# Patient Record
Sex: MALE | Race: WHITE | NOT HISPANIC OR LATINO | Employment: FULL TIME | URBAN - METROPOLITAN AREA
[De-identification: names, ages, dates, MRNs, and addresses within clinical notes are randomized per-mention and may not be internally consistent; named-entity substitution may affect disease eponyms.]

---
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
## 2025-01-07 ENCOUNTER — PATIENT MESSAGE (OUTPATIENT)
Dept: PSYCHIATRY | Facility: CLINIC | Age: 20
End: 2025-01-07
Payer: MEDICAID

## 2025-01-07 ENCOUNTER — OFFICE VISIT (OUTPATIENT)
Dept: PSYCHIATRY | Facility: CLINIC | Age: 20
End: 2025-01-07
Payer: MEDICAID

## 2025-01-07 VITALS
SYSTOLIC BLOOD PRESSURE: 125 MMHG | WEIGHT: 264.25 LBS | DIASTOLIC BLOOD PRESSURE: 52 MMHG | HEART RATE: 71 BPM | BODY MASS INDEX: 33.92 KG/M2

## 2025-01-07 DIAGNOSIS — F99 INSOMNIA DUE TO OTHER MENTAL DISORDER: ICD-10-CM

## 2025-01-07 DIAGNOSIS — F51.5 NIGHTMARES: ICD-10-CM

## 2025-01-07 DIAGNOSIS — F41.1 GAD (GENERALIZED ANXIETY DISORDER): ICD-10-CM

## 2025-01-07 DIAGNOSIS — F51.05 INSOMNIA DUE TO OTHER MENTAL DISORDER: ICD-10-CM

## 2025-01-07 DIAGNOSIS — F90.2 ATTENTION DEFICIT HYPERACTIVITY DISORDER (ADHD), COMBINED TYPE: ICD-10-CM

## 2025-01-07 DIAGNOSIS — F33.0 MDD (MAJOR DEPRESSIVE DISORDER), RECURRENT EPISODE, MILD: ICD-10-CM

## 2025-01-07 PROCEDURE — 99999 PR PBB SHADOW E&M-EST. PATIENT-LVL III: CPT | Mod: PBBFAC,,, | Performed by: REGISTERED NURSE

## 2025-01-07 PROCEDURE — 99214 OFFICE O/P EST MOD 30 MIN: CPT | Mod: S$PBB,,, | Performed by: REGISTERED NURSE

## 2025-01-07 PROCEDURE — 3078F DIAST BP <80 MM HG: CPT | Mod: CPTII,,, | Performed by: REGISTERED NURSE

## 2025-01-07 PROCEDURE — G2211 COMPLEX E/M VISIT ADD ON: HCPCS | Mod: S$PBB,,, | Performed by: REGISTERED NURSE

## 2025-01-07 PROCEDURE — 3044F HG A1C LEVEL LT 7.0%: CPT | Mod: CPTII,,, | Performed by: REGISTERED NURSE

## 2025-01-07 PROCEDURE — 3074F SYST BP LT 130 MM HG: CPT | Mod: CPTII,,, | Performed by: REGISTERED NURSE

## 2025-01-07 PROCEDURE — 3008F BODY MASS INDEX DOCD: CPT | Mod: CPTII,,, | Performed by: REGISTERED NURSE

## 2025-01-07 PROCEDURE — 99213 OFFICE O/P EST LOW 20 MIN: CPT | Mod: PBBFAC,PN | Performed by: REGISTERED NURSE

## 2025-01-07 RX ORDER — TRAZODONE HYDROCHLORIDE 100 MG/1
100 TABLET ORAL NIGHTLY
Qty: 90 TABLET | Refills: 0 | Status: SHIPPED | OUTPATIENT
Start: 2025-01-07 | End: 2025-04-30

## 2025-01-07 RX ORDER — PRAZOSIN HYDROCHLORIDE 2 MG/1
2 CAPSULE ORAL NIGHTLY
Qty: 90 CAPSULE | Refills: 0 | Status: SHIPPED | OUTPATIENT
Start: 2025-01-07

## 2025-01-07 RX ORDER — LISDEXAMFETAMINE DIMESYLATE 40 MG/1
40 CAPSULE ORAL DAILY
Qty: 30 CAPSULE | Refills: 0 | Status: SHIPPED | OUTPATIENT
Start: 2025-03-10

## 2025-01-07 RX ORDER — BUSPIRONE HYDROCHLORIDE 10 MG/1
10 TABLET ORAL 2 TIMES DAILY PRN
Qty: 60 TABLET | Refills: 0 | Status: SHIPPED | OUTPATIENT
Start: 2025-01-07 | End: 2025-01-29

## 2025-01-07 RX ORDER — LISDEXAMFETAMINE DIMESYLATE 40 MG/1
40 CAPSULE ORAL DAILY
Qty: 30 CAPSULE | Refills: 0 | Status: SHIPPED | OUTPATIENT
Start: 2025-01-13

## 2025-01-07 RX ORDER — QUETIAPINE FUMARATE 25 MG/1
TABLET, FILM COATED ORAL
Qty: 180 TABLET | Refills: 0 | Status: SHIPPED | OUTPATIENT
Start: 2025-01-07

## 2025-01-07 RX ORDER — LISDEXAMFETAMINE DIMESYLATE 40 MG/1
40 CAPSULE ORAL DAILY
Qty: 30 CAPSULE | Refills: 0 | Status: SHIPPED | OUTPATIENT
Start: 2025-02-10

## 2025-01-07 RX ORDER — PRAZOSIN HYDROCHLORIDE 1 MG/1
1 CAPSULE ORAL NIGHTLY
Qty: 90 CAPSULE | Refills: 0 | Status: SHIPPED | OUTPATIENT
Start: 2025-01-07 | End: 2025-04-24

## 2025-01-07 NOTE — PROGRESS NOTES
Outpatient Psychiatry Follow-Up Visit (MD/NP)    2025    Clinical Status of Patient:  Outpatient (Ambulatory)     Chief Complaint:  Jonathan Vance is a 19 y.o. male who presents today for follow-up of depression and anxiety.  Met with patient.    Grade: 1 st year  School:  [employer]  Job: [employer]    Lives with: parents    Interval History and Content of Current Session:  Interim Events/Subjective Report/Content of Current Session:  Patient reports preparing to start  semester of [employer] school later this month.  States classes will only be on  and .  Reports work is going well.  Does admit that dog recently  leading to increased depressed episodes.  Also states that anxiety is still difficult to deal with when elevated.  Otherwise denies noticeable side effects of medications.  Reports fair to good sleep.  Reports good appetite.    10/17/2024:  Patient reports improvement in focus and concentration.  Reports mood and anxiety are doing fair overall.  Reports taking mid terms and receiving a C in anatomy.  Also reports not liking math.  Continuing to work or go to school 7 days a week.  Denies noticeable side effects of medications otherwise.  Reports fair to good sleep.  Reports good appetite.    2024:  Patient doing well overall.  Reports improvement in focus and concentration since starting Vyvanse.  Denies noticeable side effects of medications recently.  Reports continued episodes of feeling down at times although manageable.  Also reports anxiety is manageable recently.  Reports sleeping well.  Denies recent THC usage.  Reports good appetite.      2022-initial evaluation:  Patient is a 16-year-old male who presents to clinic today for initial psychiatric evaluation with provider.  Patient presents with complaints of anxiety and depression.  Patient's mother is present with patient during majority of interview.  Patient reports he started  "noticing symptoms of anxiety and depression approximately 2 and half years ago.  States he anxiety especially when he is around people that he does not know.  Reports often not wanting to do anything or even get out of bed.  States his grandmother passed away from cancer in early 2019; following his grandmother's death he lost contact with his grandfather.  Additionally reports his close friend committed suicide in May of last year.  Patient reports anxiety leads to feelings of being overwhelmed and wanting to isolate.  Patient is currently home schooled and just completed the 11th grade course work.  States he plans to graduate in April of next year.  Patient is currently working for [employer] for the [employer].  Patient states in 2020 there were rumors at the school that the patient wanted to kill himself; the principal called the mother and the patient was evaluated at [facility] and released same day.  Patient denies ever having thoughts of suicide.  Patient has had migraines for much of his life, however they improved when the patient left in person school.  Neurologist feels that stress from school may have been increase in episodes of migraines.  Of note the patient reports he cut his thighs for a "release".  States he cut self for approximately 6 months with the most recent episode being approximately 1 year ago.  Of note according to mom the patient hid behind her as a small child was scared to go to school.  Additionally the mother reports she suffers with anxiety and depression as well.  States she currently takes Cymbalta 60 mg with positive results. Of note the patient vapes nicotine daily and delta 8 THC approximately once weekly for the past few months.  Patient enjoys hanging out with friends and watching television.  Patient has never been on medication for anxiety or depression. Patient denies current suicidal ideations, homicidal ideations, thoughts " of self-harm, paranoia and hallucinations.        Patient  reviewed this visit.     Review of Systems   PSYCHIATRIC: Pertinant items are noted in the narrative.    Past Medical, Family and Social History: The patient's past medical, family and social history have been reviewed and updated as appropriate within the electronic medical record - see encounter notes.    Compliance:  See above    Side effects: see above    Risk Parameters:  Patient reports no suicidal ideation  Patient reports no homicidal ideation  Patient reports no self-injurious behavior  Patient reports no violent behavior    Exam (detailed: at least 9 elements; comprehensive: all 15 elements)         8/8/2023     2:04 PM 6/9/2023    12:52 PM 3/3/2023     1:13 PM   GAD7   1. Feeling nervous, anxious, or on edge? 1  1  1    2. Not being able to stop or control worrying? 0  0  0    3. Worrying too much about different things? 1  1  0    4. Trouble relaxing? 1  2  2    5. Being so restless that it is hard to sit still? 1  1  1    6. Becoming easily annoyed or irritable? 0  1  0    7. Feeling afraid as if something awful might happen? 0  0  0    8. If you checked off any problems, how difficult have these problems made it for you to do your work, take care of things at home, or get along with other people? 1  2  1    MAYA-7 Score 4 6 4       Proxy-reported          No data to display                Constitutional  Vitals:  Most recent vital signs, dated less than 90 days prior to this appointment, were reviewed.   Vitals:    01/07/25 1627   BP: (!) 125/52   Pulse: 71   Weight: 119.8 kg (264 lb 3.5 oz)        General:  age appropriate, obese     Musculoskeletal  Muscle Strength/Tone:  no spasicity, no rigidity, no flaccidity   Gait & Station:  non-ataxic     Psychiatric  Speech:  no latency; no press   Mood & Affect:  steady  congruent and appropriate   Thought Process:  normal and logical   Associations:  intact   Thought Content:  normal, no  suicidality, no homicidality, delusions, or paranoia   Insight:  intact, has awareness of illness   Judgement: behavior is adequate to circumstances, age appropriate   Orientation:  grossly intact   Memory: intact for content of interview   Language: grossly intact   Attention Span & Concentration:  able to focus   Fund of Knowledge:  intact and appropriate to age and level of education, familiar with aspects of current personal life     Assessment and Diagnosis   Status/Progress: Based on the examination today, the patient's problem(s) is/are adequately but not ideally controlled.  New problems have not been presented today.   Co-morbidities are not complicating management of the primary condition.      General Impression:  Patient reports mild regression in mood and anxiety.  Reports improvement in nightmares.  Continued improvement in sleep noted.  Reports improvement in focus and concentration.  Denies noticeable side effects of medication.  Discussed increasing BuSpar for symptoms of anxiety.  Discussed risks versus benefits of medication changes.  Patient agreeable to current treatment plan.  Denies current suicidal ideations, homicidal ideations, thoughts of self-harm, paranoia and hallucinations.    Visit today included increased complexity associated with the care of the episodic problem see below addressed and managing the longitudinal care of the patient due to the serious and/or complex managed problem(s) see below.      ICD-10-CM ICD-9-CM   1. MAYA (generalized anxiety disorder)  F41.1 300.02   2. MDD (major depressive disorder), recurrent episode, mild  F33.0 296.31   3. Attention deficit hyperactivity disorder (ADHD), combined type  F90.2 314.01   4. Insomnia due to other mental disorder  F51.05 300.9    F99 327.02   5. Nightmares  F51.5 307.47       Intervention/Counseling/Treatment Plan   Medication Management: The risks and benefits of medication were discussed with the patient.  Counseling provided  with patient and family as follows: importance of compliance with chosen treatment options was emphasized, risks and benefits of treatment options, including medications, were discussed with the patient, prognosis, patient and family education, instructions for  management, treatment and follow-up were reviewed   Discussed options for ADHD treatment including stimulant medications and nonstimulant medications.  Discussed risks versus benefits of each.  Discussed risks of tardive dyskinesia, drug induced parkinsonism, metabolic side effects, including weight gain, neuroleptic malignant syndrome   Discussed risk of serotonin syndrome with these medications. Symptoms of concern include agitation/restlessness, confusion, rapid heart rate/high blood pressure, dilated pupils, loss of muscle coordination, muscle rigidity, heavy sweating.  Educated on Black Box warning for SSRI's with younger patients and suicidality. Instructed to go to ER or call 911 if thoughts of suicide begin or worsen. Patient and mother verbalized understanding.   Discussed with patient informed consent, risks vs. benefits, alternative treatments, side effect profile the inherent unpredictability of individual responses to these treatments. The patient express understanding of the above and display the capacity to agree with this current plan and had no other questions.  [facility]      Medications:   Continue Cymbalta 20 mg by mouth twice daily.  Continue Prazosin 3 mg by mouth nightly.   Continue Trazodone 100 mg by mouth nightly.   Continue Seroquel 25 mg by mouth nightly.  May take 2nd dose is still awake and 1 hour.  Stop Propranolol.    May take Buspar 10 mg by mouth two times daily as needed for anxiety.   Continue Vyvanse 40 mg by mouth daily.    Return to Clinic: 3 weeks    Patient instructed to please go to emergency department if feeling as though you are going to harm to yourself or others or if you are in crisis; or to  please call the clinic to report any worsening of symptoms or problems associated with medication.     A portion of this note was created using "[employer] voice recognition software that occasionally misinterprets phrases or words.

## 2025-01-07 NOTE — PATIENT INSTRUCTIONS
Continue Cymbalta 20 mg by mouth twice daily.    Continue Prazosin 3 mg by mouth nightly.     Continue Trazodone 100 mg by mouth nightly.     Continue Seroquel 25 mg by mouth nightly. May take 2nd dose if still awake in 1 hour.     Stop Propranolol.     May take Buspar 10 mg by mouth two times daily as needed for anxiety.     Continue Vyvanse 40 mg by mouth daily.       [facility] Today.           Please go to emergency department if feeling as though you are going to harm to yourself or others or if you are in crisis.     Please call the clinic to report any worsening of symptoms or problems associated with medication.      National Suicide Prevention Lifeline    The Lifeline provides 24/7, free and confidential support for people in distress, prevention and crisis resources for you or your loved ones, and best practices for professionals in the [place].    [phone]    988 has been designated as the new three-digit dialing code that will route callers to the National Suicide Prevention Lifeline. While some areas may be currently able to connect to the Lifeline by dialing 988, this dialing code will be available to everyone across the [place] starting on July 16, 2022.     988      Lifeline Chat    Lifeline Chat is a service of the National Suicide Prevention Lifeline, connecting individuals with counselors for emotional support and other services via web chat. All chat centers in the Lifeline network are accredited by [employer]. Lifeline Chat is available 24/7 across the [place]    https://suicidepreventionlifeline.org/chat/

## 2025-01-09 ENCOUNTER — OFFICE VISIT (OUTPATIENT)
Dept: PRIMARY CARE CLINIC | Facility: CLINIC | Age: 20
End: 2025-01-09
Payer: MEDICAID

## 2025-01-09 VITALS
WEIGHT: 269.5 LBS | BODY MASS INDEX: 34.59 KG/M2 | HEIGHT: 74 IN | HEART RATE: 83 BPM | DIASTOLIC BLOOD PRESSURE: 76 MMHG | OXYGEN SATURATION: 100 % | SYSTOLIC BLOOD PRESSURE: 130 MMHG

## 2025-01-09 DIAGNOSIS — Z11.3 SCREEN FOR STD (SEXUALLY TRANSMITTED DISEASE): ICD-10-CM

## 2025-01-09 DIAGNOSIS — Z11.4 SCREENING FOR HIV (HUMAN IMMUNODEFICIENCY VIRUS): ICD-10-CM

## 2025-01-09 DIAGNOSIS — Z00.00 ENCOUNTER FOR PREVENTIVE HEALTH EXAMINATION: Primary | ICD-10-CM

## 2025-01-09 DIAGNOSIS — Z13.220 SCREENING CHOLESTEROL LEVEL: ICD-10-CM

## 2025-01-09 DIAGNOSIS — Z13.1 SCREENING FOR DIABETES MELLITUS: ICD-10-CM

## 2025-01-09 DIAGNOSIS — Z13.29 SCREENING FOR THYROID DISORDER: ICD-10-CM

## 2025-01-09 DIAGNOSIS — F90.9 ATTENTION DEFICIT HYPERACTIVITY DISORDER (ADHD), UNSPECIFIED ADHD TYPE: ICD-10-CM

## 2025-01-09 DIAGNOSIS — Z11.59 ENCOUNTER FOR HEPATITIS C SCREENING TEST FOR LOW RISK PATIENT: ICD-10-CM

## 2025-01-09 DIAGNOSIS — Z72.0 VAPES NICOTINE CONTAINING SUBSTANCE: ICD-10-CM

## 2025-01-09 PROCEDURE — 99215 OFFICE O/P EST HI 40 MIN: CPT | Mod: PBBFAC,PN | Performed by: NURSE PRACTITIONER

## 2025-01-09 PROCEDURE — 87661 TRICHOMONAS VAGINALIS AMPLIF: CPT | Performed by: NURSE PRACTITIONER

## 2025-01-09 PROCEDURE — 87491 CHLMYD TRACH DNA AMP PROBE: CPT | Performed by: NURSE PRACTITIONER

## 2025-01-09 PROCEDURE — 99999 PR PBB SHADOW E&M-EST. PATIENT-LVL V: CPT | Mod: PBBFAC,,, | Performed by: NURSE PRACTITIONER

## 2025-01-09 NOTE — PATIENT INSTRUCTIONS
PALLIATIVE CARE PROGRESS NOTE  [facility]     Patient Name: Harrison Thomas  Date of Admission: 1/4/2025   Today the patient was seen for: goals of care and symptoms     Recommendations & Counseling       GOALS OF CARE:   DNR/DNI, wants to continue antibiotics right now, also wants to continue restorative cares in the hospital, he is ok today with interventions and testing that was ordered. He is anxious to know results of his imaging.  He is aware he will be in the hospital longer, he is ok with that, discussed I expect him to be in the hospital likely over the weekend  If he is able to discharge the plan has been to discharge with hospice, needing to make a plan that is safe for him outside of the hospital, more support at daughters or sisters house vs. [facility] vs. LTC. Appreciate unit SW supporting the process of this.  He is agreeable to revisit things early next week with our team around goals and symptoms.    ADVANCE CARE PLANNING:  Patient has an advance directive dated 3/13/2023.  Primary Health Care Agent Adeola CHANTELLE Thomas- spouse but currently divorce.  Alternate(s) daughter- Janey.  He would not want to involve his wife anymore and would want Janey to support him with decision making.  There is a POLST form on file, but will need to be updated prior to discharge.  Code status: No CPR- Do NOT Intubate    MEDICAL MANAGEMENT:   #Pain,chronic: Usually takes 10 mg oxycodone 4 times a day at home.  #Dyspnea,COPD: dyspneic in bed and with talking. Audible crackles from across the room. Continues to take morphine. He prefers to maximize the nebulizer as much as possible. Feels about the same today.  Wants to maximize duonebs, can take up to 6 doses a day. Would add two doses PRN   Can also add a saline nebulizers PRN for dyspnea  Fan at bedside would be helpful, doesn't like air blowing on his face. OK to not use if he doesn't like it. Oxygen is also likely helping  Continue Zoloft 100 mg by mouth nightly.     Continue Prazosin 3 mg by mouth nightly.     Continue Trazodone 100 mg by mouth nightly.     Continue Seroquel 25 mg by mouth nightly.    May take Propranolol 10 mg by mouth three times daily as needed for anxiety.         Please go to emergency department if feeling as though you are going to harm to yourself or others or if you are in crisis.     Please call the clinic to report any worsening of symptoms or problems associated with medication.      National Suicide Prevention Lifeline    The Lifeline provides 24/7, free and confidential support for people in distress, prevention and crisis resources for you or your loved ones, and best practices for professionals in the [place].    [phone]    988 has been designated as the new three-digit dialing code that will route callers to the National Suicide Prevention Lifeline. While some areas may be currently able to connect to the Lifeline by dialing 988, this dialing code will be available to everyone across the [place] starting on July 16, 2022.     988      Lifeline Chat    Lifeline Chat is a service of the National Suicide Prevention Lifeline, connecting individuals with counselors for emotional support and other services via web chat. All chat centers in the Lifeline network are accredited by [employer] Lifeline Chat is available 24/7 across the [place]    https://suicidepreventionlifeline.org/chat/   some with dyspnea treatment.  Continue morphine 10 mg TID scheduled, can treat baseline pain and hope would be to also treat some of his baseline dyspnea  If not taking morphine scheduled doses, would consider buprenorphine patch for dyspnea, would need to check with pharmacy/hospice agency if this would be covered.  Continue morphine to only give 10 mg dose q2h PRN  Encourage use of morphine.  If morphine not helpful I have also added lorazepam 0.25 mg q6h PRN and encouraged him to try this if the morphine is not helping     #At risk for OIC: last BM was 1/7  Consider adding 1/2 dose of miralax if no BM in 2 days    PSYCHOSOCIAL/SPIRITUAL:  Family daughter Janey.     Palliative Care will continue to follow. Thank you for the consult and allowing us to aid in the care of Harrison Thomas.    These recommendations have been discussed with primary team.    PREETI Moreira CNS  [facility], Palliative Care  Securely message with the [employer] Web Console (learn more here) or  Text page via [facility] Paging/Directory        Assessment          Harrison Thomas is a 77 year old male with a past medical history of thyroid cancer with lung metastases, parkinson's disease, neuropathy, glaucoma, osteoporosis, chronic pain, severe COPD, HLD, BRENNEN, CAD who presented on 1/4 with a 7 day history of productive cough and 1 day history of weakness. Upon admission CT pulmonary angio done and was negative for PE, treated for COPD exacerbation and PNA. Prior to admission he enrolled in hospice however presented to the hospital after being unable to reach hospice and then needed to call 911.      Today, the patient was seen for:  Severe COPD  History of thyroid cancer with lung mets  Parkinson's disease      Interval History:     Multidisciplinary collaboration:  Notes reviewed, worsening WBCs, getting IV steroids and on antibiotics as well. Getting IV lasix.     Notable medications:  Sinemet TID  Gabapentin 400 mg TID  Morphine  TID  Morphine PRN - x0  Lorazepam PRN - x0    Patient/family narrative  Saw today, he doesn't feel any worse then yesterday when I saw him. He is ok staying in the hospital a little longer. Wanted a black coffee.    Review of Systems:     Besides above, ROS was reviewed and is unremarkable        Physical Exam:   Temp:  [97.6  F (36.4  C)-98.2  F (36.8  C)] 98.2  F (36.8  C)  Pulse:  [] 82  Resp:  [16-20] 20  BP: (152-162)/(76-93) 152/76  SpO2:  [86 %-92 %] 86 %  157 lbs 14.4 oz    Constitutional: Awake, alert, cooperative, chronically ill appearing, no apparent distress  Lungs: some increased work of breathing, good air exchange  Neurologic: Awake, alert, oriented to name, place and time.  Neuropsychiatric: Normal affect, mood, orientation, memory and insight.  Skin: No rashes, erythema            Data Reviewed:     Results for orders placed or performed during the hospital encounter of 01/04/25 (from the past 24 hours)   CBC with platelets   Result Value Ref Range    WBC Count 15.2 (H) 4.0 - 11.0 10e3/uL    RBC Count 4.80 4.40 - 5.90 10e6/uL    Hemoglobin 15.3 13.3 - 17.7 g/dL    Hematocrit 44.8 40.0 - 53.0 %    MCV 93 78 - 100 fL    MCH 31.9 26.5 - 33.0 pg    MCHC 34.2 31.5 - 36.5 g/dL    RDW 13.8 10.0 - 15.0 %    Platelet Count 42 (LL) 150 - 450 10e3/uL   Basic metabolic panel   Result Value Ref Range    Sodium 142 135 - 145 mmol/L    Potassium 3.8 3.4 - 5.3 mmol/L    Chloride 101 98 - 107 mmol/L    Carbon Dioxide (CO2) 26 22 - 29 mmol/L    Anion Gap 15 7 - 15 mmol/L    Urea Nitrogen 24.2 (H) 8.0 - 23.0 mg/dL    Creatinine 0.67 0.67 - 1.17 mg/dL    GFR Estimate >90 >60 mL/min/1.73m2    Calcium 9.3 8.8 - 10.4 mg/dL    Glucose 118 (H) 70 - 99 mg/dL   RBC and Platelet Morphology   Result Value Ref Range    RBC Morphology Confirmed RBC Indices     Platelet Assessment  Automated Count Confirmed. Platelet morphology is normal.     Automated Count Confirmed. Platelet morphology is normal.    Polychromasia Slight  (A) None Seen   Procalcitonin   Result Value Ref Range    Procalcitonin 0.12 <0.50 ng/mL     *Note: Due to a large number of results and/or encounters for the requested time period, some results have not been displayed. A complete set of results can be found in Results Review.     Recent Labs   Lab 01/09/25  0958 01/08/25  0606 01/07/25  0524 01/05/25  0150 01/04/25  1227   WBC 15.2* 12.1* 8.8   < > 15.8*   HGB 15.3 15.1 14.5   < > 16.2   MCV 93 95 93   < > 92   PLT 42* 52* 62*   < > 50*    142 140   < > 142   POTASSIUM 3.8 4.4 3.7   < > 3.9   CHLORIDE 101 104 102   < > 101   CO2 26 30* 29   < > 26   BUN 24.2* 17.4 13.0   < > 26.7*   CR 0.67 0.69 0.59*   < > 0.87   ANIONGAP 15 8 9   < > 15   KARLIE 9.3 9.0 8.2*   < > 8.4*   * 74 92   < > 96   ALBUMIN  --   --   --   --  3.8   PROTTOTAL  --   --   --   --  6.1*   BILITOTAL  --   --   --   --  0.5   ALKPHOS  --   --   --   --  89   ALT  --   --   --   --  6   AST  --   --   --   --  18    < > = values in this interval not displayed.       No results found for this or any previous visit (from the past 24 hours).      Medical Decision Making       60 MINUTES SPENT BY ME on the date of service doing chart review, history, exam, documentation & further activities per the note.

## 2025-01-09 NOTE — PROGRESS NOTES
Subjective:       Patient ID: Jonathan Vance is a 19 y.o. male.    Chief Complaint: Annual Exam    History of Present Illness  CHIEF COMPLAINT:  Patient presents today for a yearly checkup and preventative care.    ALLERGIES:  He reports allergies to Claritin and morphine.    SEXUAL HEALTH:  He reports engaging in high-risk sexual activities and agrees to STD testing. He denies dysuria and urinary difficulties.    PREVENTIVE CARE:  His last eye exam was one year ago and he wears glasses. He recently completed labs with his gastroenterologist. He received rabies vaccination for school requirements but denies pneumonia or influenza vaccinations.    SOCIAL HISTORY:  He works at [employer] in [place], remaining active on his feet 7-8 hours daily. He is currently attending both college and tech school.            Past Medical History:   Diagnosis Date    Anxiety disorder, unspecified     Fractures     Migraine headache         Past Surgical History:   Procedure Laterality Date    LAPAROSCOPIC CHOLECYSTECTOMY N/A 7/12/2024    Procedure: CHOLECYSTECTOMY, LAPAROSCOPIC;  Surgeon: Forest Brewer MD;  Location: [facility] OR [address] [place] [address];  Service: General;  Laterality: N/A;    NO PAST SURGERIES          Family History   Problem Relation Name Age of Onset    Broken bones Mother      No Known Problems Father      No Known Problems Maternal Grandfather      No Known Problems Paternal Grandmother         Social History     Tobacco Use   Smoking Status Every Day   Smokeless Tobacco Never   Tobacco Comments    Vaping with nicotine       Social History     Social History Narrative    Lives in [place] with both parents and 10th grade attends home school        Review of patient's allergies indicates:   Allergen Reactions    Claritin [loratadine]     Opioids - morphine analogues Hallucinations        Review of Systems   Constitutional:  Negative for activity change.   HENT:  Negative for hearing loss and trouble swallowing.   "  Eyes:  Negative for discharge.   Respiratory:  Negative for chest tightness and wheezing.    Cardiovascular:  Negative for chest pain and palpitations.   Gastrointestinal:  Negative for constipation, diarrhea and vomiting.   Genitourinary:  Negative for difficulty urinating and hematuria.   Musculoskeletal:  Negative for neck pain.   Neurological:  Negative for headaches.   Psychiatric/Behavioral:  Negative for dysphoric mood.          Objective:      Vitals:    01/09/25 1541 01/09/25 1600   BP: (!) 147/66 130/76   BP Location: Right arm    Patient Position: Sitting    Pulse: 83    SpO2: 100%    Weight: 122.3 kg (269 lb 8.2 oz)    Height: 6' 2" (1.88 m)         Physical Exam  Constitutional:       Appearance: Normal appearance.   Eyes:      Comments: Wears glasses   Pulmonary:      Effort: Pulmonary effort is normal. No respiratory distress.   Musculoskeletal:         General: Normal range of motion.      Cervical back: Normal range of motion.   Neurological:      Mental Status: He is alert and oriented to person, place, and time.         Assessment:       1. Encounter for preventive health examination    2. Attention deficit hyperactivity disorder (ADHD), unspecified ADHD type    3. Screening cholesterol level    4. Screening for diabetes mellitus    5. Screening for HIV (human immunodeficiency virus)    6. Screening for thyroid disorder    7. Encounter for hepatitis C screening test for low risk patient    8. Screen for STD (sexually transmitted disease)    9. Vapes nicotine containing substance        Plan:       Encounter for preventive health examination  Counseled patient on importance of health prevention screening, immunizations, and overall wellness.   Immunizations reviewed.      Attention deficit hyperactivity disorder (ADHD), unspecified ADHD type  Stable, continuing current management.       Screening cholesterol level  -     Lipid Panel; Future; Expected date: 01/09/2025    Screening for diabetes " mellitus  -     Hemoglobin A1C; Future; Expected date: 01/09/2025    Screening for HIV (human immunodeficiency virus)  -     HIV 1/2 Ag/Ab (4th Gen); Future; Expected date: 01/09/2025    Screening for thyroid disorder  -     T4, Free; Future; Expected date: 01/09/2025  -     TSH; Future; Expected date: 01/09/2025    Encounter for hepatitis C screening test for low risk patient  -     Hepatitis C Antibody; Future; Expected date: 01/09/2025    Screen for STD (sexually transmitted disease)  -     C. trachomatis/N. gonorrhoeae by AMP DNA  -     Trichomonas vaginalis, RNA, Qual, Urine  -     Treponema Pallidium Antibodies IgG, IgM; Future; Expected date: 01/09/2025    Vapes nicotine containing substance    BMI 34.0-34.9,adult     Assessment & Plan    PREVENTATIVE CARE:  - Conducted a yearly checkup and preventative care exam for the patient.  - Reviewed previous lab results and determined additional tests are needed.    DIABETES SCREENING:  - Identified the need for diabetes screening.  - Ordered a glucose test as part of the labs.    HYPERLIPIDEMIA SCREENING:  - Identified the need for cholesterol screening.  - Ordered a cholesterol panel as part of the labs.    THYROID DISORDER SCREENING:  - Identified the need for thyroid screening.  - Ordered a thyroid panel as part of the labs.    SEXUALLY TRANSMITTED DISEASES (STDS) SCREENING:  - Acknowledged the need for STD testing based on the patient's request.  - Ordered urine tests for chlamydia, gonorrhea, and trichomonas.    URINARY TRACT INFECTION SCREENING:  - Ordered urine tests for urinary tract infection.    HYPERTENSION SCREENING:  - Instructed the patient to follow up in room 4 after check-out for a blood pressure check.    RABIES VACCINATION:  - Noted that the patient received a rabies shot for school.    FLU VACCINATION:  - Inquired about pneumonia and flu vaccinations.  - Recommend pneumonia and flu vaccinations.  - Administered flu vaccination.    PNEUMONIA  VACCINATION:  - Inquired about pneumonia and flu vaccinations.  - Recommend pneumonia and flu vaccinations.    LABS:  - Ordered labs for diabetes screening, cholesterol, and thyroid function.  - Ordered urine tests for sexually transmitted diseases (STDs) and urinary tract infection.    OTHER INSTRUCTIONS:  - Urinalysis ordered.  - Complete labs before leaving the office.          Medication List with Changes/Refills   Current Medications    AZELASTINE (ASTELIN) 137 MCG (0.1 %) NASAL SPRAY    Spray 2 sprays into each nostil daily.    BUSPIRONE (BUSPAR) 10 MG TABLET    Take 1 tablet (10 mg total) by mouth 2 (two) times daily as needed (anxiety).    CETIRIZINE (ZYRTEC) 10 MG TABLET    Take 1 tablet (10 mg total) by mouth once daily.    DULOXETINE (CYMBALTA) 20 MG CAPSULE    Take 1 capsule (20 mg total) by mouth 2 (two) times daily.    LISDEXAMFETAMINE (VYVANSE) 40 MG CAP    Take 1 capsule (40 mg total) by mouth once daily.    LISDEXAMFETAMINE (VYVANSE) 40 MG CAP    Take 1 capsule (40 mg total) by mouth once daily.    LISDEXAMFETAMINE (VYVANSE) 40 MG CAP    Take 1 capsule (40 mg total) by mouth once daily.    METOCLOPRAMIDE HCL (REGLAN) 5 MG TABLET    Take one tablet by mouth three times a day before meals as needed    ONDANSETRON (ZOFRAN) 8 MG TABLET    Take 1 tablet by mouth three times a day as needed.    PANTOPRAZOLE (PROTONIX) 40 MG TABLET    Take 1 tablet (40 mg total) by mouth once daily 30 minutes before breakfast.    PRAZOSIN (MINIPRESS) 1 MG CAP    Take 1 capsule (1 mg total) by mouth every evening. (With 2 mg - total 3 mg at bedtime)    PRAZOSIN (MINIPRESS) 2 MG CAP    Take 1 capsule (2 mg total) by mouth every evening. With 1 mg capsule to total 3 mg nightly.    PREDNISONE (DELTASONE) 20 MG TABLET    Take 2 tablet by mouth in the morning for 5 days with food.    PROMETHAZINE-DEXTROMETHORPHAN (PROMETHAZINE-DM) 6.25-15 MG/5 ML SYRP    Take 5 mLs by mouth every 4 to 6 hours as needed for cough    QUETIAPINE  (SEROQUEL) 25 MG TAB    Take 1 tablet by mouth nightly as needed for insomnia. May repeat if still awake in 1 hour.    SUCRALFATE (CARAFATE) 1 GRAM TABLET    Dissolve 1 tablet in water and drink four times a day.    TRAZODONE (DESYREL) 100 MG TABLET    Take 1 tablet (100 mg total) by mouth every evening.            Follow up if symptoms worsen or fail to improve.        MIKY Hernandez, MSN, FNP-C     This note was generated with the assistance of ambient listening technology. Verbal consent was obtained by the patient and accompanying visitor(s) for the recording of patient appointment to facilitate this note. I attest to having reviewed and edited the generated note for accuracy, though some syntax or spelling errors may persist. Please contact the author of this note for any clarification.

## 2025-01-09 NOTE — PATIENT INSTRUCTIONS
If you receive a survey, please complete it; thanks  Happy New Year!!!     Please get your labs done at the laboratory today, once you have checked out for this appointment, we will get you schedule for  labs to be done today.    I will communicate your laboratory and/or imaging results with you through your [employer] account that was set up through the portal, it was a pleasure meeting and taking care of you today.

## 2025-01-10 DIAGNOSIS — F41.1 GAD (GENERALIZED ANXIETY DISORDER): ICD-10-CM

## 2025-01-10 DIAGNOSIS — F33.0 MDD (MAJOR DEPRESSIVE DISORDER), RECURRENT EPISODE, MILD: ICD-10-CM

## 2025-01-10 RX ORDER — DULOXETIN HYDROCHLORIDE 20 MG/1
20 CAPSULE, DELAYED RELEASE ORAL 2 TIMES DAILY
Qty: 180 CAPSULE | Refills: 0 | Status: SHIPPED | OUTPATIENT
Start: 2025-01-10 | End: 2025-04-10

## 2025-01-11 LAB
SPECIMEN SOURCE: NORMAL
T VAGINALIS RRNA SPEC QL NAA+PROBE: NEGATIVE

## 2025-01-29 ENCOUNTER — OFFICE VISIT (OUTPATIENT)
Dept: PSYCHIATRY | Facility: CLINIC | Age: 20
End: 2025-01-29
Payer: MEDICAID

## 2025-01-29 DIAGNOSIS — F90.2 ATTENTION DEFICIT HYPERACTIVITY DISORDER (ADHD), COMBINED TYPE: ICD-10-CM

## 2025-01-29 DIAGNOSIS — F99 INSOMNIA DUE TO OTHER MENTAL DISORDER: ICD-10-CM

## 2025-01-29 DIAGNOSIS — F51.5 NIGHTMARES: ICD-10-CM

## 2025-01-29 DIAGNOSIS — F33.0 MDD (MAJOR DEPRESSIVE DISORDER), RECURRENT EPISODE, MILD: ICD-10-CM

## 2025-01-29 DIAGNOSIS — F41.1 GAD (GENERALIZED ANXIETY DISORDER): Primary | ICD-10-CM

## 2025-01-29 DIAGNOSIS — F51.05 INSOMNIA DUE TO OTHER MENTAL DISORDER: ICD-10-CM

## 2025-01-29 PROCEDURE — 1159F MED LIST DOCD IN RCRD: CPT | Mod: CPTII,95,, | Performed by: REGISTERED NURSE

## 2025-01-29 PROCEDURE — G2211 COMPLEX E/M VISIT ADD ON: HCPCS | Mod: 95,,, | Performed by: REGISTERED NURSE

## 2025-01-29 PROCEDURE — 3044F HG A1C LEVEL LT 7.0%: CPT | Mod: CPTII,95,, | Performed by: REGISTERED NURSE

## 2025-01-29 PROCEDURE — 98006 SYNCH AUDIO-VIDEO EST MOD 30: CPT | Mod: 95,,, | Performed by: REGISTERED NURSE

## 2025-01-29 PROCEDURE — 1160F RVW MEDS BY RX/DR IN RCRD: CPT | Mod: CPTII,95,, | Performed by: REGISTERED NURSE

## 2025-01-29 RX ORDER — BUSPIRONE HYDROCHLORIDE 10 MG/1
10 TABLET ORAL 3 TIMES DAILY
Qty: 90 TABLET | Refills: 1 | Status: SHIPPED | OUTPATIENT
Start: 2025-01-29 | End: 2025-03-30

## 2025-01-29 NOTE — PROGRESS NOTES
Outpatient Psychiatry Follow-Up Visit (MD/NP)    2025    Clinical Status of Patient:  Outpatient (Ambulatory)(Virtual)  The patient location is:  [facility]  The patient phone number is:  [phone]  Visit type: Virtual visit with synchronous audio and video  Each patient to whom he or she provides medical services by telemedicine is:  (1) informed of the relationship between the physician and patient and the respective role of any other health care provider with respect to management of the patient; and (2) notified that he or she may decline to receive medical services by telemedicine and may withdraw from such care at any time.  Crisis Disclaimer: Patient was informed that due to the virtual nature of the visit, that if a crisis develops, protocols will be implemented to ensure patient safety, including but not limited to: 1) Initiating a welfare check with local Law Enforcement, 2) Calling Traveler | VIP1/National Crisis Hotline, and/or 3) Initiating PEC/CEC procedures.      Chief Complaint:  Jonathan Vance is a 19 y.o. male who presents today for follow-up of depression and anxiety.  Met with patient.    Grade: 1 st year  School:  [employer]  Job: [employer]    Lives with: parents    Interval History and Content of Current Session:  Interim Events/Subjective Report/Content of Current Session:  Patient continues to struggle with anxiety.  However feels that focus and concentration are doing well overall.  States that BuSpar does not seem to help with breakthrough anxiety.  Also reports no noticeable benefit of propanolol recently.  Otherwise denies noticeable side effects of medications.  Reports fair sleep.  Reports good appetite.    2025:  Patient reports preparing to start  semester of [employer] later this month.  States classes will only be on  and .  Reports work is going well.  Does admit that dog recently  leading to  increased depressed episodes.  Also states that anxiety is still difficult to deal with when elevated.  Otherwise denies noticeable side effects of medications.  Reports fair to good sleep.  Reports good appetite.    10/17/2024:  Patient reports improvement in focus and concentration.  Reports mood and anxiety are doing fair overall.  Reports taking mid terms and receiving a C in anatomy.  Also reports not liking math.  Continuing to work or go to school 7 days a week.  Denies noticeable side effects of medications otherwise.  Reports fair to good sleep.  Reports good appetite.    09/25/2024:  Patient doing well overall.  Reports improvement in focus and concentration since starting Vyvanse.  Denies noticeable side effects of medications recently.  Reports continued episodes of feeling down at times although manageable.  Also reports anxiety is manageable recently.  Reports sleeping well.  Denies recent THC usage.  Reports good appetite.      04/18/2022-initial evaluation:  Patient is a 16-year-old male who presents to clinic today for initial psychiatric evaluation with provider.  Patient presents with complaints of anxiety and depression.  Patient's mother is present with patient during majority of interview.  Patient reports he started noticing symptoms of anxiety and depression approximately 2 and half years ago.  States he anxiety especially when he is around people that he does not know.  Reports often not wanting to do anything or even get out of bed.  States his grandmother passed away from cancer in early 2019; following his grandmother's death he lost contact with his grandfather.  Additionally reports his close friend committed suicide in May of last year.  Patient reports anxiety leads to feelings of being overwhelmed and wanting to isolate.  Patient is currently home schooled and just completed the 11th grade course work.  States he plans to graduate in April of next year.  Patient is currently working  "for [employer] for the online  group.  Patient states in 2020 there were rumors at the school that the patient wanted to kill himself; the principal called the mother and the patient was evaluated at [facility] and released same day.  Patient denies ever having thoughts of suicide.  Patient has had migraines for much of his life, however they improved when the patient left in person school.  Neurologist feels that stress from school may have been increase in episodes of migraines.  Of note the patient reports he cut his thighs for a "release".  States he cut self for approximately 6 months with the most recent episode being approximately 1 year ago.  Of note according to mom the patient hid behind her as a small child was scared to go to school.  Additionally the mother reports she suffers with anxiety and depression as well.  States she currently takes Cymbalta 60 mg with positive results. Of note the patient vapes nicotine daily and delta 8 THC approximately once weekly for the past few months.  Patient enjoys hanging out with friends and watching television.  Patient has never been on medication for anxiety or depression. Patient denies current suicidal ideations, homicidal ideations, thoughts of self-harm, paranoia and hallucinations.        Patient  reviewed this visit.     Review of Systems   PSYCHIATRIC: Pertinant items are noted in the narrative.    Past Medical, Family and Social History: The patient's past medical, family and social history have been reviewed and updated as appropriate within the electronic medical record - see encounter notes.    Compliance:  See above    Side effects: see above    Risk Parameters:  Patient reports no suicidal ideation  Patient reports no homicidal ideation  Patient reports no self-injurious behavior  Patient reports no violent behavior    Exam (detailed: at least 9 elements; comprehensive: all 15 elements)         8/8/2023    "  2:04 PM 6/9/2023    12:52 PM 3/3/2023     1:13 PM   GAD7   1. Feeling nervous, anxious, or on edge? 1  1  1    2. Not being able to stop or control worrying? 0  0  0    3. Worrying too much about different things? 1  1  0    4. Trouble relaxing? 1  2  2    5. Being so restless that it is hard to sit still? 1  1  1    6. Becoming easily annoyed or irritable? 0  1  0    7. Feeling afraid as if something awful might happen? 0  0  0    8. If you checked off any problems, how difficult have these problems made it for you to do your work, take care of things at home, or get along with other people? 1  2  1    MAYA-7 Score 4 6 4       Proxy-reported          No data to display                Constitutional  Vitals:  Most recent vital signs, dated less than 90 days prior to this appointment, were reviewed.   There were no vitals filed for this visit.       General:  age appropriate, obese     Musculoskeletal  Muscle Strength/Tone:  no spasicity, no rigidity, no flaccidity   Gait & Station:  non-ataxic     Psychiatric  Speech:  no latency; no press   Mood & Affect:  steady  congruent and appropriate   Thought Process:  normal and logical   Associations:  intact   Thought Content:  normal, no suicidality, no homicidality, delusions, or paranoia   Insight:  intact, has awareness of illness   Judgement: behavior is adequate to circumstances, age appropriate   Orientation:  grossly intact   Memory: intact for content of interview   Language: grossly intact   Attention Span & Concentration:  able to focus   Fund of Knowledge:  intact and appropriate to age and level of education, familiar with aspects of current personal life     Assessment and Diagnosis   Status/Progress: Based on the examination today, the patient's problem(s) is/are adequately but not ideally controlled.  New problems have not been presented today.   Co-morbidities are not complicating management of the primary condition.      General Impression:  Patient  reports mild improvement in mood.  Reports minimal change in anxiety.  Reports improvement in nightmares.  Continued improvement in sleep noted.  Reports improvement in focus and concentration.  Denies noticeable side effects of medication.  Discussed increasing BuSpar for symptoms of anxiety.  Discussed risks versus benefits of medication changes.  Patient agreeable to current treatment plan.  Denies current suicidal ideations, homicidal ideations, thoughts of self-harm, paranoia and hallucinations.    Visit today included increased complexity associated with the care of the episodic problem see below addressed and managing the longitudinal care of the patient due to the serious and/or complex managed problem(s) see below.      ICD-10-CM ICD-9-CM   1. MAYA (generalized anxiety disorder)  F41.1 300.02   2. MDD (major depressive disorder), recurrent episode, mild  F33.0 296.31   3. Attention deficit hyperactivity disorder (ADHD), combined type  F90.2 314.01   4. Insomnia due to other mental disorder  F51.05 300.9    F99 327.02   5. Nightmares  F51.5 307.47       Intervention/Counseling/Treatment Plan   Medication Management: The risks and benefits of medication were discussed with the patient.  Counseling provided with patient and family as follows: importance of compliance with chosen treatment options was emphasized, risks and benefits of treatment options, including medications, were discussed with the patient, prognosis, patient and family education, instructions for  management, treatment and follow-up were reviewed   Discussed options for ADHD treatment including stimulant medications and nonstimulant medications.  Discussed risks versus benefits of each.  Discussed risks of tardive dyskinesia, drug induced parkinsonism, metabolic side effects, including weight gain, neuroleptic malignant syndrome   Discussed risk of serotonin syndrome with these medications. Symptoms of concern include agitation/restlessness,  confusion, rapid heart rate/high blood pressure, dilated pupils, loss of muscle coordination, muscle rigidity, heavy sweating.  Educated on Black Box warning for SSRI's with younger patients and suicidality. Instructed to go to ER or call 911 if thoughts of suicide begin or worsen. Patient and mother verbalized understanding.   Discussed with patient informed consent, risks vs. benefits, alternative treatments, side effect profile the inherent unpredictability of individual responses to these treatments. The patient express understanding of the above and display the capacity to agree with this current plan and had no other questions.  [facility] collection today      Medications:   Continue Cymbalta 20 mg by mouth twice daily.  Continue Prazosin 3 mg by mouth nightly.   Continue Trazodone 100 mg by mouth nightly.   Continue Seroquel 25 mg by mouth nightly.  May take 2nd dose is still awake and 1 hour.  Increase BuSpar 10 mg by mouth 3 times daily.  Continue Vyvanse 40 mg by mouth daily.    Return to Clinic: 1 month    Patient instructed to please go to emergency department if feeling as though you are going to harm to yourself or others or if you are in crisis; or to please call the clinic to report any worsening of symptoms or problems associated with medication.     A portion of this note was created using [employer] voice recognition software that occasionally misinterprets phrases or words.

## 2025-02-10 NOTE — PATIENT INSTRUCTIONS
Continue Cymbalta 20 mg by mouth twice daily.    Continue Prazosin 3 mg by mouth nightly.     Continue Trazodone 100 mg by mouth nightly.     Continue Seroquel 25 mg by mouth nightly. May take 2nd dose if still awake in 1 hour.     Increase Buspar 10 mg by mouth 3 times daily.     Continue Vyvanse 40 mg by mouth daily.       [facility] Today.           Please go to emergency department if feeling as though you are going to harm to yourself or others or if you are in crisis.     Please call the clinic to report any worsening of symptoms or problems associated with medication.      National Suicide Prevention Lifeline    The Lifeline provides 24/7, free and confidential support for people in distress, prevention and crisis resources for you or your loved ones, and best practices for professionals in the [place].    8-946-041-5958    988 has been designated as the new three-digit dialing code that will route callers to the National Suicide Prevention Lifeline. While some areas may be currently able to connect to the Lifeline by dialing 988, this dialing code will be available to everyone across the [place] starting on July 16, 2022.     988      Lifeline Chat    Lifeline Chat is a service of the National Suicide Prevention Lifeline, connecting individuals with counselors for emotional support and other services via web chat. All chat centers in the Lifeline network are accredited by [employer]. Lifeline Chat is available 24/7 across the [place]    [email or web address]e.org/chat/

## 2025-02-19 ENCOUNTER — PATIENT MESSAGE (OUTPATIENT)
Dept: PRIMARY CARE CLINIC | Facility: CLINIC | Age: 20
End: 2025-02-19
Payer: MEDICAID

## 2025-02-19 DIAGNOSIS — R79.89 ABNORMAL TSH: Primary | ICD-10-CM

## 2025-02-26 ENCOUNTER — RESULTS FOLLOW-UP (OUTPATIENT)
Dept: PRIMARY CARE CLINIC | Facility: CLINIC | Age: 20
End: 2025-02-26
Payer: MEDICAID

## 2025-03-26 ENCOUNTER — OFFICE VISIT (OUTPATIENT)
Dept: PSYCHIATRY | Facility: CLINIC | Age: 20
End: 2025-03-26
Payer: MEDICAID

## 2025-03-26 DIAGNOSIS — F33.0 MDD (MAJOR DEPRESSIVE DISORDER), RECURRENT EPISODE, MILD: ICD-10-CM

## 2025-03-26 DIAGNOSIS — F41.1 GAD (GENERALIZED ANXIETY DISORDER): ICD-10-CM

## 2025-03-26 DIAGNOSIS — F51.5 NIGHTMARES: ICD-10-CM

## 2025-03-26 DIAGNOSIS — F51.05 INSOMNIA DUE TO OTHER MENTAL DISORDER: ICD-10-CM

## 2025-03-26 DIAGNOSIS — F99 INSOMNIA DUE TO OTHER MENTAL DISORDER: ICD-10-CM

## 2025-03-26 DIAGNOSIS — F90.2 ATTENTION DEFICIT HYPERACTIVITY DISORDER (ADHD), COMBINED TYPE: ICD-10-CM

## 2025-03-26 PROCEDURE — 1160F RVW MEDS BY RX/DR IN RCRD: CPT | Mod: CPTII,95,, | Performed by: REGISTERED NURSE

## 2025-03-26 PROCEDURE — 3044F HG A1C LEVEL LT 7.0%: CPT | Mod: CPTII,95,, | Performed by: REGISTERED NURSE

## 2025-03-26 PROCEDURE — 1159F MED LIST DOCD IN RCRD: CPT | Mod: CPTII,95,, | Performed by: REGISTERED NURSE

## 2025-03-26 PROCEDURE — G2211 COMPLEX E/M VISIT ADD ON: HCPCS | Mod: 95,,, | Performed by: REGISTERED NURSE

## 2025-03-26 PROCEDURE — 98006 SYNCH AUDIO-VIDEO EST MOD 30: CPT | Mod: 95,,, | Performed by: REGISTERED NURSE

## 2025-03-26 RX ORDER — QUETIAPINE FUMARATE 25 MG/1
TABLET, FILM COATED ORAL
Qty: 180 TABLET | Refills: 0 | Status: SHIPPED | OUTPATIENT
Start: 2025-03-26

## 2025-03-26 RX ORDER — LISDEXAMFETAMINE DIMESYLATE 40 MG/1
40 CAPSULE ORAL DAILY
Qty: 30 CAPSULE | Refills: 0 | Status: SHIPPED | OUTPATIENT
Start: 2025-06-18

## 2025-03-26 RX ORDER — LISDEXAMFETAMINE DIMESYLATE 40 MG/1
40 CAPSULE ORAL DAILY
Qty: 30 CAPSULE | Refills: 0 | Status: SHIPPED | OUTPATIENT
Start: 2025-05-21

## 2025-03-26 RX ORDER — PRAZOSIN HYDROCHLORIDE 1 MG/1
1 CAPSULE ORAL NIGHTLY
Qty: 90 CAPSULE | Refills: 0 | Status: SHIPPED | OUTPATIENT
Start: 2025-03-26 | End: 2025-07-08

## 2025-03-26 RX ORDER — PRAZOSIN HYDROCHLORIDE 2 MG/1
2 CAPSULE ORAL NIGHTLY
Qty: 90 CAPSULE | Refills: 0 | Status: SHIPPED | OUTPATIENT
Start: 2025-03-26

## 2025-03-26 RX ORDER — BUSPIRONE HYDROCHLORIDE 10 MG/1
10 TABLET ORAL 3 TIMES DAILY
Qty: 360 TABLET | Refills: 0 | Status: SHIPPED | OUTPATIENT
Start: 2025-03-26 | End: 2025-07-24

## 2025-03-26 RX ORDER — TRAZODONE HYDROCHLORIDE 100 MG/1
100 TABLET ORAL NIGHTLY
Qty: 90 TABLET | Refills: 0 | Status: SHIPPED | OUTPATIENT
Start: 2025-03-26 | End: 2025-07-25

## 2025-03-26 RX ORDER — DULOXETIN HYDROCHLORIDE 30 MG/1
30 CAPSULE, DELAYED RELEASE ORAL 2 TIMES DAILY
Qty: 180 CAPSULE | Refills: 0 | Status: SHIPPED | OUTPATIENT
Start: 2025-03-26 | End: 2025-06-24

## 2025-03-26 RX ORDER — LISDEXAMFETAMINE DIMESYLATE 40 MG/1
40 CAPSULE ORAL DAILY
Qty: 30 CAPSULE | Refills: 0 | Status: SHIPPED | OUTPATIENT
Start: 2025-04-23

## 2025-03-26 NOTE — PATIENT INSTRUCTIONS
Increase Cymbalta 30 mg by mouth twice daily.    Continue Prazosin 3 mg by mouth nightly.     Continue Trazodone 100 mg by mouth nightly.     Continue Seroquel 25 mg by mouth nightly. May take 2nd dose if still awake in 1 hour.     Continue Buspar 10 mg by mouth 3 times daily.  May take 10 mg once daily as needed for increased anxiety.    Continue Vyvanse 40 mg by mouth daily.               Please go to emergency department if feeling as though you are going to harm to yourself or others or if you are in crisis.     Please call the clinic to report any worsening of symptoms or problems associated with medication.      National Suicide Prevention Lifeline    The Lifeline provides 24/7, free and confidential support for people in distress, prevention and crisis resources for you or your loved ones, and best practices for professionals in the [place].    2-639-091-4489    988 has been designated as the new three-digit dialing code that will route callers to the National Suicide Prevention Lifeline. While some areas may be currently able to connect to the Lifeline by dialing 988, this dialing code will be available to everyone across the [place] starting on July 16, 2022.     988      Lifeline Chat    Lifeline Chat is a service of the National Suicide Prevention Lifeline, connecting individuals with counselors for emotional support and other services via web chat. All chat centers in the Lifeline network are accredited by [employer]. Lifeline Chat is available 24/7 across the [place]    https://suicidepreventionlifeline.org/chat/

## 2025-03-26 NOTE — PROGRESS NOTES
Outpatient Psychiatry Follow-Up Visit (MD/NP)    3/26/2025    Clinical Status of Patient:  Outpatient (Ambulatory)(Virtual)  The patient location is:  [facility]  The patient phone number is:  [phone]  Visit type: Virtual visit with synchronous audio and video  Each patient to whom he or she provides medical services by telemedicine is:  (1) informed of the relationship between the physician and patient and the respective role of any other health care provider with respect to management of the patient; and (2) notified that he or she may decline to receive medical services by telemedicine and may withdraw from such care at any time.  Crisis Disclaimer: Patient was informed that due to the virtual nature of the visit, that if a crisis develops, protocols will be implemented to ensure patient safety, including but not limited to: 1) Initiating a welfare check with local Law Enforcement, 2) Calling SOPATec[employer]/National Crisis Hotline, and/or 3) Initiating PEC/CEC procedures.      Chief Complaint:  Jonathan Vance is a 19 y.o. male who presents today for follow-up of depression and anxiety.  Met with patient.    Grade: 1 st year  School:  [employer]  Job: [employer]    Lives with: parents    Interval History and Content of Current Session:  Interim Events/Subjective Report/Content of Current Session:  Continues to struggle with anxiety.  States it makes it difficult to keep up with work in school.  Denies noticeable side effects of medications otherwise.  Reports fair sleep.  Reports good appetite.    01/29/2025:  Patient continues to struggle with anxiety.  However feels that focus and concentration are doing well overall.  States that BuSpar does not seem to help with breakthrough anxiety.  Also reports no noticeable benefit of propanolol recently.  Otherwise denies noticeable side effects of medications.  Reports fair sleep.  Reports good appetite.    01/07/2025:   Patient reports preparing to start  semester of [employer] school later this month.  States classes will only be on  and .  Reports work is going well.  Does admit that dog recently  leading to increased depressed episodes.  Also states that anxiety is still difficult to deal with when elevated.  Otherwise denies noticeable side effects of medications.  Reports fair to good sleep.  Reports good appetite.      2022-initial evaluation:  Patient is a 16-year-old male who presents to clinic today for initial psychiatric evaluation with provider.  Patient presents with complaints of anxiety and depression.  Patient's mother is present with patient during majority of interview.  Patient reports he started noticing symptoms of anxiety and depression approximately 2 and half years ago.  States he anxiety especially when he is around people that he does not know.  Reports often not wanting to do anything or even get out of bed.  States his grandmother passed away from cancer in early ; following his grandmother's death he lost contact with his grandfather.  Additionally reports his close friend committed suicide in May of last year.  Patient reports anxiety leads to feelings of being overwhelmed and wanting to isolate.  Patient is currently home schooled and just completed the 11th grade course work.  States he plans to graduate in April of next year.  Patient is currently working for [employer] for the [employer]  group.  Patient states in  there were rumors at the school that the patient wanted to kill himself; the principal called the mother and the patient was evaluated at [facility] Emergency Department and released same day.  Patient denies ever having thoughts of suicide.  Patient has had migraines for much of his life, however they improved when the patient left in person school.  Neurologist feels that stress from school may have been increase in episodes of migraines.  " Of note the patient reports he cut his thighs for a "release".  States he cut self for approximately 6 months with the most recent episode being approximately 1 year ago.  Of note according to mom the patient hid behind her as a small child was scared to go to school.  Additionally the mother reports she suffers with anxiety and depression as well.  States she currently takes Cymbalta 60 mg with positive results. Of note the patient vapes nicotine daily and delta 8 THC approximately once weekly for the past few months.  Patient enjoys hanging out with friends and watching television.  Patient has never been on medication for anxiety or depression. Patient denies current suicidal ideations, homicidal ideations, thoughts of self-harm, paranoia and hallucinations.        Patient  reviewed this visit.     Review of Systems   PSYCHIATRIC: Pertinant items are noted in the narrative.    Past Medical, Family and Social History: The patient's past medical, family and social history have been reviewed and updated as appropriate within the electronic medical record - see encounter notes.    Compliance:  See above    Side effects: see above    Risk Parameters:  Patient reports no suicidal ideation  Patient reports no homicidal ideation  Patient reports no self-injurious behavior  Patient reports no violent behavior    Exam (detailed: at least 9 elements; comprehensive: all 15 elements)         8/8/2023     2:04 PM 6/9/2023    12:52 PM 3/3/2023     1:13 PM   GAD7   1. Feeling nervous, anxious, or on edge? 1  1  1    2. Not being able to stop or control worrying? 0  0  0    3. Worrying too much about different things? 1  1  0    4. Trouble relaxing? 1  2  2    5. Being so restless that it is hard to sit still? 1  1  1    6. Becoming easily annoyed or irritable? 0  1  0    7. Feeling afraid as if something awful might happen? 0  0  0    8. If you checked off any problems, how difficult have these problems made it for you to do " your work, take care of things at home, or get along with other people? 1  2  1    MAYA-7 Score 4 6 4       Proxy-reported          No data to display                Constitutional  Vitals:  Most recent vital signs, dated less than 90 days prior to this appointment, were reviewed.   There were no vitals filed for this visit.       General:  age appropriate, obese     Musculoskeletal  Muscle Strength/Tone:  no spasicity, no rigidity, no flaccidity   Gait & Station:  non-ataxic     Psychiatric  Speech:  no latency; no press   Mood & Affect:  steady  congruent and appropriate   Thought Process:  normal and logical   Associations:  intact   Thought Content:  normal, no suicidality, no homicidality, delusions, or paranoia   Insight:  intact, has awareness of illness   Judgement: behavior is adequate to circumstances, age appropriate   Orientation:  grossly intact   Memory: intact for content of interview   Language: grossly intact   Attention Span & Concentration:  able to focus   Fund of Knowledge:  intact and appropriate to age and level of education, familiar with aspects of current personal life     Assessment and Diagnosis   Status/Progress: Based on the examination today, the patient's problem(s) is/are adequately but not ideally controlled.  New problems have not been presented today.   Co-morbidities are not complicating management of the primary condition.      General Impression:  Patient reports mild improvement in mood.  Reports minimal change in anxiety.  Reports improvement in nightmares.  Mild fluctuations in sleep noted.  Reports improvement in focus and concentration.  Denies noticeable side effects of medication.  Discussed increasing BuSpar and Cymbalta for symptoms of anxiety.  Discussed risks versus benefits of medication changes.  Patient agreeable to current treatment plan.  Denies current suicidal ideations, homicidal ideations, thoughts of self-harm, paranoia and hallucinations.    Visit today  included increased complexity associated with the care of the episodic problem see below addressed and managing the longitudinal care of the patient due to the serious and/or complex managed problem(s) see below.      ICD-10-CM ICD-9-CM   1. MDD (major depressive disorder), recurrent episode, mild  F33.0 296.31   2. MAYA (generalized anxiety disorder)  F41.1 300.02   3. Attention deficit hyperactivity disorder (ADHD), combined type  F90.2 314.01   4. Insomnia due to other mental disorder  F51.05 300.9    F99 327.02   5. Nightmares  F51.5 307.47       Intervention/Counseling/Treatment Plan   Medication Management: The risks and benefits of medication were discussed with the patient.  Counseling provided with patient and family as follows: importance of compliance with chosen treatment options was emphasized, risks and benefits of treatment options, including medications, were discussed with the patient, prognosis, patient and family education, instructions for  management, treatment and follow-up were reviewed   Discussed options for ADHD treatment including stimulant medications and nonstimulant medications.  Discussed risks versus benefits of each.  Discussed risks of tardive dyskinesia, drug induced parkinsonism, metabolic side effects, including weight gain, neuroleptic malignant syndrome   Discussed risk of serotonin syndrome with these medications. Symptoms of concern include agitation/restlessness, confusion, rapid heart rate/high blood pressure, dilated pupils, loss of muscle coordination, muscle rigidity, heavy sweating.  Educated on Black Box warning for SSRI's with younger patients and suicidality. Instructed to go to ER or call 911 if thoughts of suicide begin or worsen. Patient and mother verbalized understanding.   Discussed with patient informed consent, risks vs. benefits, alternative treatments, side effect profile the inherent unpredictability of individual responses to these treatments. The patient  express understanding of the above and display the capacity to agree with this current plan and had no other questions.  GeneSight results discussed today      Medications:   Increase Cymbalta 30 mg by mouth twice daily.  Continue Prazosin 3 mg by mouth nightly.   Continue Trazodone 100 mg by mouth nightly.   Continue Seroquel 25 mg by mouth nightly. May take 2nd dose if still awake in 1 hour.   Continue Buspar 10 mg by mouth 3 times daily.  May take 10 mg once daily as needed for increased anxiety.  Continue Vyvanse 40 mg by mouth daily.       Return to Clinic: 2 months    Patient instructed to please go to emergency department if feeling as though you are going to harm to yourself or others or if you are in crisis; or to please call the clinic to report any worsening of symptoms or problems associated with medication.     A portion of this note was created using [employer] voice recognition software that occasionally misinterprets phrases or words.

## 2025-03-28 ENCOUNTER — PATIENT MESSAGE (OUTPATIENT)
Dept: ORTHOPEDICS | Facility: CLINIC | Age: 20
End: 2025-03-28
Payer: MEDICAID

## 2025-05-24 ENCOUNTER — OFFICE VISIT (OUTPATIENT)
Dept: URGENT CARE | Facility: CLINIC | Age: 20
End: 2025-05-24
Payer: MEDICAID

## 2025-05-24 VITALS
BODY MASS INDEX: 32.08 KG/M2 | DIASTOLIC BLOOD PRESSURE: 84 MMHG | SYSTOLIC BLOOD PRESSURE: 133 MMHG | OXYGEN SATURATION: 98 % | HEIGHT: 74 IN | WEIGHT: 250 LBS | RESPIRATION RATE: 20 BRPM | HEART RATE: 71 BPM | TEMPERATURE: 99 F

## 2025-05-24 DIAGNOSIS — M54.42 BILATERAL LOW BACK PAIN WITH LEFT-SIDED SCIATICA, UNSPECIFIED CHRONICITY: Primary | ICD-10-CM

## 2025-05-24 PROCEDURE — 99214 OFFICE O/P EST MOD 30 MIN: CPT | Mod: S$GLB,,, | Performed by: PHYSICIAN ASSISTANT

## 2025-05-24 RX ORDER — DEXAMETHASONE SODIUM PHOSPHATE 10 MG/ML
10 INJECTION INTRAMUSCULAR; INTRAVENOUS
Status: COMPLETED | OUTPATIENT
Start: 2025-05-24 | End: 2025-05-24

## 2025-05-24 RX ORDER — LIDOCAINE 50 MG/G
1 PATCH TOPICAL DAILY
Qty: 30 PATCH | Refills: 0 | Status: SHIPPED | OUTPATIENT
Start: 2025-05-24

## 2025-05-24 RX ORDER — MELOXICAM 15 MG/1
15 TABLET ORAL DAILY
Qty: 30 TABLET | Refills: 0 | Status: SHIPPED | OUTPATIENT
Start: 2025-05-24

## 2025-05-24 RX ORDER — KETOROLAC TROMETHAMINE 30 MG/ML
30 INJECTION, SOLUTION INTRAMUSCULAR; INTRAVENOUS
Status: COMPLETED | OUTPATIENT
Start: 2025-05-24 | End: 2025-05-24

## 2025-05-24 RX ORDER — METHOCARBAMOL 500 MG/1
500 TABLET, FILM COATED ORAL 4 TIMES DAILY
Qty: 30 TABLET | Refills: 0 | Status: SHIPPED | OUTPATIENT
Start: 2025-05-24

## 2025-05-24 RX ADMIN — KETOROLAC TROMETHAMINE 30 MG: 30 INJECTION, SOLUTION INTRAMUSCULAR; INTRAVENOUS at 12:05

## 2025-05-24 RX ADMIN — DEXAMETHASONE SODIUM PHOSPHATE 10 MG: 10 INJECTION INTRAMUSCULAR; INTRAVENOUS at 12:05

## 2025-05-24 NOTE — PROGRESS NOTES
"Subjective:      Patient ID: Jonathan Vance is a 19 y.o. male.    Vitals:  height is 6' 2" (1.88 m) and weight is 113.4 kg (250 lb). His oral temperature is 98.7 °F (37.1 °C). His blood pressure is 133/84 and his pulse is 71. His respiration is 20 and oxygen saturation is 98%.     Chief Complaint: Back Pain (i have back injections at [facility] but my appointment with them isn't until middle of june. my back pain is getting pretty bad - Entered by patient)    Pt presents today with c/o lower back pain for a while now. Pt states that he has chronic back pain and receives injections at [facility] but doesn't have an appointment until the end of June. Pt has taken tylenol and muscle relaxant for sx with no relief. Pain level 8/10.    Back Pain  This is a new problem. The problem occurs constantly. The problem is unchanged. The quality of the pain is described as stabbing. The pain radiates to the left thigh. The pain is at a severity of 8/10. The pain is severe. The pain is The same all the time. Stiffness is present All day. Associated symptoms include leg pain, numbness, tingling and weakness. He has tried muscle relaxant for the symptoms. The treatment provided no relief.       Musculoskeletal:  Positive for back pain.   Neurological:  Positive for numbness.      Objective:     Physical Exam    Assessment:     No diagnosis found.    Plan:       There are no diagnoses linked to this encounter.                  "

## 2025-05-26 ENCOUNTER — OFFICE VISIT (OUTPATIENT)
Dept: PSYCHIATRY | Facility: CLINIC | Age: 20
End: 2025-05-26
Payer: MEDICAID

## 2025-05-26 VITALS
WEIGHT: 281.06 LBS | BODY MASS INDEX: 36.07 KG/M2 | SYSTOLIC BLOOD PRESSURE: 135 MMHG | HEIGHT: 74 IN | DIASTOLIC BLOOD PRESSURE: 81 MMHG | HEART RATE: 92 BPM

## 2025-05-26 DIAGNOSIS — F41.1 GAD (GENERALIZED ANXIETY DISORDER): ICD-10-CM

## 2025-05-26 DIAGNOSIS — F51.5 NIGHTMARES: ICD-10-CM

## 2025-05-26 DIAGNOSIS — F51.05 INSOMNIA DUE TO OTHER MENTAL DISORDER: ICD-10-CM

## 2025-05-26 DIAGNOSIS — F90.2 ATTENTION DEFICIT HYPERACTIVITY DISORDER (ADHD), COMBINED TYPE: ICD-10-CM

## 2025-05-26 DIAGNOSIS — F99 INSOMNIA DUE TO OTHER MENTAL DISORDER: ICD-10-CM

## 2025-05-26 DIAGNOSIS — F33.0 MDD (MAJOR DEPRESSIVE DISORDER), RECURRENT EPISODE, MILD: ICD-10-CM

## 2025-05-26 PROCEDURE — 99214 OFFICE O/P EST MOD 30 MIN: CPT | Mod: PBBFAC,PN | Performed by: REGISTERED NURSE

## 2025-05-26 PROCEDURE — 1159F MED LIST DOCD IN RCRD: CPT | Mod: CPTII,,, | Performed by: REGISTERED NURSE

## 2025-05-26 PROCEDURE — 3008F BODY MASS INDEX DOCD: CPT | Mod: CPTII,,, | Performed by: REGISTERED NURSE

## 2025-05-26 PROCEDURE — 3075F SYST BP GE 130 - 139MM HG: CPT | Mod: CPTII,,, | Performed by: REGISTERED NURSE

## 2025-05-26 PROCEDURE — 3044F HG A1C LEVEL LT 7.0%: CPT | Mod: CPTII,,, | Performed by: REGISTERED NURSE

## 2025-05-26 PROCEDURE — 1160F RVW MEDS BY RX/DR IN RCRD: CPT | Mod: CPTII,,, | Performed by: REGISTERED NURSE

## 2025-05-26 PROCEDURE — 3079F DIAST BP 80-89 MM HG: CPT | Mod: CPTII,,, | Performed by: REGISTERED NURSE

## 2025-05-26 PROCEDURE — G2211 COMPLEX E/M VISIT ADD ON: HCPCS | Mod: ,,, | Performed by: REGISTERED NURSE

## 2025-05-26 PROCEDURE — 99214 OFFICE O/P EST MOD 30 MIN: CPT | Mod: S$PBB,,, | Performed by: REGISTERED NURSE

## 2025-05-26 PROCEDURE — 99999 PR PBB SHADOW E&M-EST. PATIENT-LVL IV: CPT | Mod: PBBFAC,,, | Performed by: REGISTERED NURSE

## 2025-05-26 RX ORDER — TRAZODONE HYDROCHLORIDE 100 MG/1
100 TABLET ORAL NIGHTLY
Qty: 90 TABLET | Refills: 0 | Status: SHIPPED | OUTPATIENT
Start: 2025-05-26 | End: 2025-08-24

## 2025-05-26 RX ORDER — PRAZOSIN HYDROCHLORIDE 2 MG/1
2 CAPSULE ORAL NIGHTLY
Qty: 90 CAPSULE | Refills: 0 | Status: SHIPPED | OUTPATIENT
Start: 2025-05-26

## 2025-05-26 RX ORDER — BUSPIRONE HYDROCHLORIDE 10 MG/1
10 TABLET ORAL 3 TIMES DAILY
Qty: 360 TABLET | Refills: 0 | Status: SHIPPED | OUTPATIENT
Start: 2025-05-26 | End: 2025-09-23

## 2025-05-26 RX ORDER — LISDEXAMFETAMINE DIMESYLATE 40 MG/1
40 CAPSULE ORAL DAILY
Qty: 30 CAPSULE | Refills: 0 | Status: SHIPPED | OUTPATIENT
Start: 2025-07-21

## 2025-05-26 RX ORDER — DULOXETIN HYDROCHLORIDE 30 MG/1
30 CAPSULE, DELAYED RELEASE ORAL 2 TIMES DAILY
Qty: 180 CAPSULE | Refills: 0 | Status: SHIPPED | OUTPATIENT
Start: 2025-05-26 | End: 2025-08-24

## 2025-05-26 RX ORDER — QUETIAPINE FUMARATE 25 MG/1
TABLET, FILM COATED ORAL
Qty: 180 TABLET | Refills: 0 | Status: SHIPPED | OUTPATIENT
Start: 2025-05-26

## 2025-05-26 RX ORDER — PRAZOSIN HYDROCHLORIDE 1 MG/1
1 CAPSULE ORAL NIGHTLY
Qty: 90 CAPSULE | Refills: 0 | Status: SHIPPED | OUTPATIENT
Start: 2025-05-26 | End: 2025-08-24

## 2025-05-26 NOTE — PROGRESS NOTES
Outpatient Psychiatry Follow-Up Visit (MD/NP)    5/26/2025    Clinical Status of Patient:  Outpatient (Ambulatory)     Chief Complaint:  Jonathan Vance is a 19 y.o. male who presents today for follow-up of depression and anxiety.  Met with patient.    Grade: 1 st year  School:  [employer]  Job: [employer]    Lives with: parents    Interval History and Content of Current Session:  Interim Events/Subjective Report/Content of Current Session:  Patient reports improvement in anxiety recently.  Continued improvement in focus and concentration during school and work.  Denies recent significant nightmares.  Continues with fluctuations in sleep although improved with quetiapine.  Denies noticeable side effects of medications recently.  Reports good appetite.    03/26/2025:  Continues to struggle with anxiety.  States it makes it difficult to keep up with work in school.  Denies noticeable side effects of medications otherwise.  Reports fair sleep.  Reports good appetite.    01/29/2025:  Patient continues to struggle with anxiety.  However feels that focus and concentration are doing well overall.  States that BuSpar does not seem to help with breakthrough anxiety.  Also reports no noticeable benefit of propanolol recently.  Otherwise denies noticeable side effects of medications.  Reports fair sleep.  Reports good appetite.      04/18/2022-initial evaluation:  Patient is a 16-year-old male who presents to clinic today for initial psychiatric evaluation with provider.  Patient presents with complaints of anxiety and depression.  Patient's mother is present with patient during majority of interview.  Patient reports he started noticing symptoms of anxiety and depression approximately 2 and half years ago.  States he anxiety especially when he is around people that he does not know.  Reports often not wanting to do anything or even get out of bed.  States his grandmother passed away from cancer  "in early 2019; following his grandmother's death he lost contact with his grandfather.  Additionally reports his close friend committed suicide in May of last year.  Patient reports anxiety leads to feelings of being overwhelmed and wanting to isolate.  Patient is currently home schooled and just completed the 11th grade course work.  States he plans to graduate in April of next year.  Patient is currently working for [employer] for the [employer] group.  Patient states in 2020 there were rumors at the school that the patient wanted to kill himself; the principal called the mother and the patient was evaluated at [facility] Emergency Department and released same day.  Patient denies ever having thoughts of suicide.  Patient has had migraines for much of his life, however they improved when the patient left in person school.  Neurologist feels that stress from school may have been increase in episodes of migraines.  Of note the patient reports he cut his thighs for a "release".  States he cut self for approximately 6 months with the most recent episode being approximately 1 year ago.  Of note according to mom the patient hid behind her as a small child was scared to go to school.  Additionally the mother reports she suffers with anxiety and depression as well.  States she currently takes Cymbalta 60 mg with positive results. Of note the patient vapes nicotine daily and delta 8 THC approximately once weekly for the past few months.  Patient enjoys hanging out with friends and watching television.  Patient has never been on medication for anxiety or depression. Patient denies current suicidal ideations, homicidal ideations, thoughts of self-harm, paranoia and hallucinations.        Patient  reviewed this visit.     Review of Systems   PSYCHIATRIC: Pertinant items are noted in the narrative.    Past Medical, Family and Social History: The patient's past medical, family and social history have been " "reviewed and updated as appropriate within the electronic medical record - see encounter notes.    Compliance:  See above    Side effects: see above    Risk Parameters:  Patient reports no suicidal ideation  Patient reports no homicidal ideation  Patient reports no self-injurious behavior  Patient reports no violent behavior    Exam (detailed: at least 9 elements; comprehensive: all 15 elements)         8/8/2023     2:04 PM 6/9/2023    12:52 PM 3/3/2023     1:13 PM   GAD7   1. Feeling nervous, anxious, or on edge? 1  1  1    2. Not being able to stop or control worrying? 0  0  0    3. Worrying too much about different things? 1  1  0    4. Trouble relaxing? 1  2  2    5. Being so restless that it is hard to sit still? 1  1  1    6. Becoming easily annoyed or irritable? 0  1  0    7. Feeling afraid as if something awful might happen? 0  0  0    8. If you checked off any problems, how difficult have these problems made it for you to do your work, take care of things at home, or get along with other people? 1  2  1    MAYA-7 Score 4 6 4       Proxy-reported          No data to display                Constitutional  Vitals:  Most recent vital signs, dated less than 90 days prior to this appointment, were reviewed.   Vitals:    05/26/25 1323   BP: 135/81   Pulse: 92   Weight: 127.5 kg (281 lb 1.4 oz)   Height: 6' 2" (1.88 m)          General:  age appropriate, obese     Musculoskeletal  Muscle Strength/Tone:  no spasicity, no rigidity, no flaccidity   Gait & Station:  non-ataxic     Psychiatric  Speech:  no latency; no press   Mood & Affect:  steady  congruent and appropriate   Thought Process:  normal and logical   Associations:  intact   Thought Content:  normal, no suicidality, no homicidality, delusions, or paranoia   Insight:  intact, has awareness of illness   Judgement: behavior is adequate to circumstances, age appropriate   Orientation:  grossly intact   Memory: intact for content of interview   Language: grossly " intact   Attention Span & Concentration:  able to focus   Fund of Knowledge:  intact and appropriate to age and level of education, familiar with aspects of current personal life     Assessment and Diagnosis   Status/Progress: Based on the examination today, the patient's problem(s) is/are adequately but not ideally controlled.  New problems have not been presented today.   Co-morbidities are not complicating management of the primary condition.      General Impression:  Patient reports mild improvement in mood.  Reports mild improvement in anxiety.  Reports improvement in nightmares.  Mild fluctuations in sleep noted.  Reports improvement in focus and concentration.  Denies noticeable side effects of medication.  Denies wanting change to medication at this time.  Patient agreeable to current treatment plan.  Denies current suicidal ideations, homicidal ideations, thoughts of self-harm, paranoia and hallucinations.    Visit today included increased complexity associated with the care of the episodic problem see below addressed and managing the longitudinal care of the patient due to the serious and/or complex managed problem(s) see below.      ICD-10-CM ICD-9-CM   1. MDD (major depressive disorder), recurrent episode, mild  F33.0 296.31   2. MAYA (generalized anxiety disorder)  F41.1 300.02   3. Attention deficit hyperactivity disorder (ADHD), combined type  F90.2 314.01   4. Insomnia due to other mental disorder  F51.05 300.9    F99 327.02   5. Nightmares  F51.5 307.47       Intervention/Counseling/Treatment Plan   Medication Management: The risks and benefits of medication were discussed with the patient.  Counseling provided with patient and family as follows: importance of compliance with chosen treatment options was emphasized, risks and benefits of treatment options, including medications, were discussed with the patient, prognosis, patient and family education, instructions for  management, treatment and  follow-up were reviewed   Discussed options for ADHD treatment including stimulant medications and nonstimulant medications.  Discussed risks versus benefits of each.  Discussed risks of tardive dyskinesia, drug induced parkinsonism, metabolic side effects, including weight gain, neuroleptic malignant syndrome   Discussed risk of serotonin syndrome with these medications. Symptoms of concern include agitation/restlessness, confusion, rapid heart rate/high blood pressure, dilated pupils, loss of muscle coordination, muscle rigidity, heavy sweating.  Educated on Black Box warning for SSRI's with younger patients and suicidality. Instructed to go to ER or call 911 if thoughts of suicide begin or worsen. Patient and mother verbalized understanding.   Discussed with patient informed consent, risks vs. benefits, alternative treatments, side effect profile the inherent unpredictability of individual responses to these treatments. The patient express understanding of the above and display the capacity to agree with this current plan and had no other questions.      Medications:   Continue Cymbalta 30 mg by mouth twice daily.  Continue Prazosin 3 mg by mouth nightly.   Continue Trazodone 100 mg by mouth nightly.   Continue Seroquel 25 mg by mouth nightly. May take 2nd dose if still awake in 1 hour.   Continue Buspar 10 mg by mouth 3 times daily.  May take 10 mg once daily as needed for increased anxiety.  Continue Vyvanse 40 mg by mouth daily.       Return to Clinic: 3 months    Patient instructed to please go to emergency department if feeling as though you are going to harm to yourself or others or if you are in crisis; or to please call the clinic to report any worsening of symptoms or problems associated with medication.     A portion of this note was created using [employer] voice recognition software that occasionally misinterprets phrases or words.

## 2025-05-26 NOTE — PATIENT INSTRUCTIONS
Continue Cymbalta 30 mg by mouth twice daily.    Continue Prazosin 3 mg by mouth nightly.     Continue Trazodone 100 mg by mouth nightly.     Continue Seroquel 25 mg by mouth nightly. May take 2nd dose if still awake in 1 hour.     Continue Buspar 10 mg by mouth 3 times daily.  May take 10 mg once daily as needed for increased anxiety.    Continue Vyvanse 40 mg by mouth daily.               Please go to emergency department if feeling as though you are going to harm to yourself or others or if you are in crisis.     Please call the clinic to report any worsening of symptoms or problems associated with medication.      National Suicide Prevention Lifeline    The Lifeline provides 24/7, free and confidential support for people in distress, prevention and crisis resources for you or your loved ones, and best practices for professionals in the [place].    7-909-117-8612    988 has been designated as the new three-digit dialing code that will route callers to the National Suicide Prevention Lifeline. While some areas may be currently able to connect to the Lifeline by dialing 988, this dialing code will be available to everyone across the [place] starting on July 16, 2022.     988      Lifeline Chat    Lifeline Chat is a service of the National Suicide Prevention Lifeline, connecting individuals with counselors for emotional support and other services via web chat. All chat centers in the Lifeline network are accredited by [employer]. Lifeline Chat is available 24/7 across the [place]    https://suicidepreventionlifeline.org/chat/

## 2025-06-03 ENCOUNTER — TELEPHONE (OUTPATIENT)
Dept: PRIMARY CARE CLINIC | Facility: CLINIC | Age: 20
End: 2025-06-03
Payer: MEDICAID

## 2025-06-06 ENCOUNTER — PATIENT MESSAGE (OUTPATIENT)
Dept: PRIMARY CARE CLINIC | Facility: CLINIC | Age: 20
End: 2025-06-06
Payer: MEDICAID

## 2025-07-30 ENCOUNTER — OFFICE VISIT (OUTPATIENT)
Dept: PSYCHIATRY | Facility: CLINIC | Age: 20
End: 2025-07-30
Payer: MEDICAID

## 2025-07-30 DIAGNOSIS — F51.5 NIGHTMARES: ICD-10-CM

## 2025-07-30 DIAGNOSIS — F41.1 GAD (GENERALIZED ANXIETY DISORDER): Primary | ICD-10-CM

## 2025-07-30 DIAGNOSIS — F51.05 INSOMNIA DUE TO OTHER MENTAL DISORDER: ICD-10-CM

## 2025-07-30 DIAGNOSIS — F90.2 ATTENTION DEFICIT HYPERACTIVITY DISORDER (ADHD), COMBINED TYPE: ICD-10-CM

## 2025-07-30 DIAGNOSIS — F33.0 MDD (MAJOR DEPRESSIVE DISORDER), RECURRENT EPISODE, MILD: ICD-10-CM

## 2025-07-30 DIAGNOSIS — F99 INSOMNIA DUE TO OTHER MENTAL DISORDER: ICD-10-CM

## 2025-07-30 PROCEDURE — 98006 SYNCH AUDIO-VIDEO EST MOD 30: CPT | Mod: 95,,, | Performed by: REGISTERED NURSE

## 2025-07-30 PROCEDURE — G2211 COMPLEX E/M VISIT ADD ON: HCPCS | Mod: 95,,, | Performed by: REGISTERED NURSE

## 2025-07-30 RX ORDER — TRAZODONE HYDROCHLORIDE 100 MG/1
100 TABLET ORAL NIGHTLY
Qty: 90 TABLET | Refills: 0 | Status: SHIPPED | OUTPATIENT
Start: 2025-07-30 | End: 2025-10-28

## 2025-07-30 RX ORDER — LISDEXAMFETAMINE DIMESYLATE 40 MG/1
40 CAPSULE ORAL DAILY
Qty: 30 CAPSULE | Refills: 0 | Status: SHIPPED | OUTPATIENT
Start: 2025-08-20

## 2025-07-30 RX ORDER — PRAZOSIN HYDROCHLORIDE 1 MG/1
1 CAPSULE ORAL NIGHTLY
Qty: 90 CAPSULE | Refills: 0 | Status: SHIPPED | OUTPATIENT
Start: 2025-07-30 | End: 2025-10-28

## 2025-07-30 RX ORDER — QUETIAPINE FUMARATE 25 MG/1
TABLET, FILM COATED ORAL
Qty: 180 TABLET | Refills: 0 | Status: SHIPPED | OUTPATIENT
Start: 2025-07-30

## 2025-07-30 RX ORDER — PRAZOSIN HYDROCHLORIDE 2 MG/1
2 CAPSULE ORAL NIGHTLY
Qty: 90 CAPSULE | Refills: 0 | Status: SHIPPED | OUTPATIENT
Start: 2025-07-30

## 2025-07-30 RX ORDER — LISDEXAMFETAMINE DIMESYLATE 40 MG/1
40 CAPSULE ORAL DAILY
Qty: 30 CAPSULE | Refills: 0 | Status: SHIPPED | OUTPATIENT
Start: 2025-09-17

## 2025-07-30 RX ORDER — LISDEXAMFETAMINE DIMESYLATE 40 MG/1
40 CAPSULE ORAL DAILY
Qty: 30 CAPSULE | Refills: 0 | Status: SHIPPED | OUTPATIENT
Start: 2025-10-15

## 2025-07-30 RX ORDER — DULOXETIN HYDROCHLORIDE 30 MG/1
30 CAPSULE, DELAYED RELEASE ORAL 2 TIMES DAILY
Qty: 180 CAPSULE | Refills: 0 | Status: SHIPPED | OUTPATIENT
Start: 2025-07-30 | End: 2025-11-27

## 2025-07-30 RX ORDER — BUSPIRONE HYDROCHLORIDE 10 MG/1
10 TABLET ORAL 3 TIMES DAILY
Qty: 360 TABLET | Refills: 0 | Status: SHIPPED | OUTPATIENT
Start: 2025-07-30 | End: 2025-11-27

## 2025-08-04 ENCOUNTER — TELEPHONE (OUTPATIENT)
Dept: PSYCHIATRY | Facility: CLINIC | Age: 20
End: 2025-08-04
Payer: MEDICAID

## 2025-08-04 NOTE — TELEPHONE ENCOUNTER
Called pt no answer left voicemail     ----- Message from Nurse Mary sent at 8/4/2025 11:31 AM CDT -----  Regarding: FW: f/u    ----- Message -----  From: Mary Kaufman LPN  Sent: 7/30/2025   5:02 PM CDT  To: Mary Kaufman LPN  Subject: FW: f/u                                            ----- Message -----  From: Leopoldo Aguirre NP  Sent: 7/30/2025   5:00 PM CDT  To: Timothy Mina [facility] Staff  Subject: f/u                                              Please schedule follow up in person for 3 months.

## 2025-08-16 ENCOUNTER — PATIENT MESSAGE (OUTPATIENT)
Dept: PRIMARY CARE CLINIC | Facility: CLINIC | Age: 20
End: 2025-08-16
Payer: COMMERCIAL

## 2025-08-16 ENCOUNTER — PATIENT MESSAGE (OUTPATIENT)
Dept: PSYCHIATRY | Facility: CLINIC | Age: 20
End: 2025-08-16
Payer: COMMERCIAL

## 2025-08-25 ENCOUNTER — TELEPHONE (OUTPATIENT)
Dept: PRIMARY CARE CLINIC | Facility: CLINIC | Age: 20
End: 2025-08-25
Payer: COMMERCIAL

## 2025-08-27 ENCOUNTER — OFFICE VISIT (OUTPATIENT)
Dept: PRIMARY CARE CLINIC | Facility: CLINIC | Age: 20
End: 2025-08-27
Payer: COMMERCIAL

## 2025-08-27 ENCOUNTER — TELEPHONE (OUTPATIENT)
Dept: PRIMARY CARE CLINIC | Facility: CLINIC | Age: 20
End: 2025-08-27

## 2025-08-27 VITALS
WEIGHT: 295 LBS | SYSTOLIC BLOOD PRESSURE: 138 MMHG | OXYGEN SATURATION: 98 % | BODY MASS INDEX: 37.86 KG/M2 | DIASTOLIC BLOOD PRESSURE: 84 MMHG | HEIGHT: 74 IN | HEART RATE: 97 BPM

## 2025-08-27 DIAGNOSIS — E66.01 SEVERE OBESITY: ICD-10-CM

## 2025-08-27 DIAGNOSIS — F90.9 ATTENTION DEFICIT HYPERACTIVITY DISORDER (ADHD), UNSPECIFIED ADHD TYPE: Primary | ICD-10-CM

## 2025-08-27 PROCEDURE — 3008F BODY MASS INDEX DOCD: CPT | Mod: CPTII,S$GLB,, | Performed by: NURSE PRACTITIONER

## 2025-08-27 PROCEDURE — 1159F MED LIST DOCD IN RCRD: CPT | Mod: CPTII,S$GLB,, | Performed by: NURSE PRACTITIONER

## 2025-08-27 PROCEDURE — 99999 PR PBB SHADOW E&M-EST. PATIENT-LVL V: CPT | Mod: PBBFAC,,, | Performed by: NURSE PRACTITIONER

## 2025-08-27 PROCEDURE — 3075F SYST BP GE 130 - 139MM HG: CPT | Mod: CPTII,S$GLB,, | Performed by: NURSE PRACTITIONER

## 2025-08-27 PROCEDURE — 99214 OFFICE O/P EST MOD 30 MIN: CPT | Mod: S$GLB,,, | Performed by: NURSE PRACTITIONER

## 2025-08-27 PROCEDURE — 3044F HG A1C LEVEL LT 7.0%: CPT | Mod: CPTII,S$GLB,, | Performed by: NURSE PRACTITIONER

## 2025-08-27 PROCEDURE — 3079F DIAST BP 80-89 MM HG: CPT | Mod: CPTII,S$GLB,, | Performed by: NURSE PRACTITIONER

## (undated) DEVICE — DRAPE CORETEMP FLD WRM 56X62IN

## (undated) DEVICE — BAG TISS RETRV MONARCH 10MM

## (undated) DEVICE — NDL HYPO STD REG BVL 18GX1.5IN

## (undated) DEVICE — BLADE SURG CARBON STEEL SZ11

## (undated) DEVICE — TROCAR ENDOPATH XCEL 5MM 7.5CM

## (undated) DEVICE — TROCAR ENDOPATH XCEL 5X75MM

## (undated) DEVICE — IRRIGATOR ENDOSCOPY DISP.

## (undated) DEVICE — TROCAR KII BLLN 12MM 10CM

## (undated) DEVICE — SUT MCRYL PLUS 4-0 PS2 27IN

## (undated) DEVICE — TUBING HF INSUFFLATION W/ FLTR

## (undated) DEVICE — NDL HYPO REG 25G X 1 1/2

## (undated) DEVICE — TRAY MINOR GEN SURG OMC

## (undated) DEVICE — APPLIER CLIP ENDO LIGAMAX 5MM

## (undated) DEVICE — ADHESIVE DERMABOND ADVANCED

## (undated) DEVICE — SOL NS 1000CC

## (undated) DEVICE — DRAPE STERI INSTRUMENT 1018

## (undated) DEVICE — SUT 0 VICRYL / UR6 (J603)

## (undated) DEVICE — SCISSOR 5MMX35CM DIRECT DRIVE

## (undated) DEVICE — ELECTRODE REM PLYHSV RETURN 9

## (undated) DEVICE — DRAPE ABDOMINAL TIBURON 14X11

## (undated) DEVICE — KIT ANTIFOG W/SPONG & FLUID

## (undated) DEVICE — TOWEL OR DISP STRL BLUE 4/PK